# Patient Record
Sex: MALE | Race: WHITE | URBAN - NONMETROPOLITAN AREA
[De-identification: names, ages, dates, MRNs, and addresses within clinical notes are randomized per-mention and may not be internally consistent; named-entity substitution may affect disease eponyms.]

---

## 2022-10-27 ENCOUNTER — TELEPHONE (OUTPATIENT)
Dept: CARDIOLOGY CLINIC | Age: 67
End: 2022-10-27

## 2022-10-31 ENCOUNTER — TELEPHONE (OUTPATIENT)
Dept: CARDIOLOGY CLINIC | Age: 67
End: 2022-10-31

## 2022-12-02 ENCOUNTER — TELEPHONE (OUTPATIENT)
Dept: CARDIOLOGY CLINIC | Age: 67
End: 2022-12-02

## 2023-01-16 ENCOUNTER — OFFICE VISIT (OUTPATIENT)
Dept: CARDIOLOGY CLINIC | Age: 68
End: 2023-01-16
Payer: OTHER GOVERNMENT

## 2023-01-16 VITALS
SYSTOLIC BLOOD PRESSURE: 130 MMHG | HEART RATE: 62 BPM | HEIGHT: 77 IN | DIASTOLIC BLOOD PRESSURE: 62 MMHG | WEIGHT: 235 LBS | BODY MASS INDEX: 27.75 KG/M2

## 2023-01-16 DIAGNOSIS — R00.2 PALPITATIONS: ICD-10-CM

## 2023-01-16 DIAGNOSIS — I10 PRIMARY HYPERTENSION: ICD-10-CM

## 2023-01-16 DIAGNOSIS — I25.10 ATHEROSCLEROSIS OF NATIVE CORONARY ARTERY OF NATIVE HEART WITHOUT ANGINA PECTORIS: Primary | ICD-10-CM

## 2023-01-16 DIAGNOSIS — R01.1 HEART MURMUR: ICD-10-CM

## 2023-01-16 DIAGNOSIS — E78.2 MIXED HYPERLIPIDEMIA: ICD-10-CM

## 2023-01-16 DIAGNOSIS — I49.3 PVC (PREMATURE VENTRICULAR CONTRACTION): ICD-10-CM

## 2023-01-16 DIAGNOSIS — F17.211 CIGARETTE NICOTINE DEPENDENCE IN REMISSION: ICD-10-CM

## 2023-01-16 PROCEDURE — 99204 OFFICE O/P NEW MOD 45 MIN: CPT | Performed by: CLINICAL NURSE SPECIALIST

## 2023-01-16 PROCEDURE — 3075F SYST BP GE 130 - 139MM HG: CPT | Performed by: CLINICAL NURSE SPECIALIST

## 2023-01-16 PROCEDURE — 1123F ACP DISCUSS/DSCN MKR DOCD: CPT | Performed by: CLINICAL NURSE SPECIALIST

## 2023-01-16 PROCEDURE — 93000 ELECTROCARDIOGRAM COMPLETE: CPT | Performed by: CLINICAL NURSE SPECIALIST

## 2023-01-16 PROCEDURE — 3078F DIAST BP <80 MM HG: CPT | Performed by: CLINICAL NURSE SPECIALIST

## 2023-01-16 RX ORDER — SIMVASTATIN 40 MG
40 TABLET ORAL NIGHTLY
COMMUNITY

## 2023-01-16 RX ORDER — LEVOTHYROXINE SODIUM 175 UG/1
175 TABLET ORAL DAILY
COMMUNITY

## 2023-01-16 RX ORDER — ASPIRIN/CALCIUM/MAG/ALUMINUM 325 MG
TABLET ORAL
COMMUNITY

## 2023-01-16 RX ORDER — METHOCARBAMOL 500 MG/1
500 TABLET, FILM COATED ORAL 4 TIMES DAILY
COMMUNITY

## 2023-01-16 RX ORDER — OMEPRAZOLE 20 MG/1
20 CAPSULE, DELAYED RELEASE ORAL DAILY
COMMUNITY

## 2023-01-16 RX ORDER — MELOXICAM 7.5 MG/1
7.5 TABLET ORAL DAILY
COMMUNITY

## 2023-01-16 RX ORDER — METOPROLOL TARTRATE 100 MG/1
100 TABLET ORAL 2 TIMES DAILY
COMMUNITY

## 2023-01-16 RX ORDER — LISINOPRIL 5 MG/1
5 TABLET ORAL DAILY
COMMUNITY

## 2023-01-16 RX ORDER — ACETAMINOPHEN 500 MG
500 TABLET ORAL EVERY 6 HOURS PRN
COMMUNITY

## 2023-01-16 ASSESSMENT — ENCOUNTER SYMPTOMS
BACK PAIN: 1
EYE REDNESS: 0
NAUSEA: 0
COUGH: 0
SHORTNESS OF BREATH: 0
VOMITING: 0
WHEEZING: 0
ABDOMINAL PAIN: 0
FACIAL SWELLING: 0
CHEST TIGHTNESS: 0

## 2023-01-16 NOTE — PATIENT INSTRUCTIONS
[place] at the [facility] and [address] located on the first floor of [place] [address] through hospital main entrance and turn immediately to your left. Patient's contact number:  [phone] (home)      Lexiscan Stress Test      Lexiscan (regadenoson injection) is a prescription drug given through an IV line that increases blood flow through the arteries of the heart during a cardiac nuclear stress test.     There are two parts to a Lexiscan stress test: the rest portion and the exercise portion. For the rest portion, a radioactive tracer is injected into your arm through the IV. After 30 to 60 minutes, the process of imaging will begin. A nuclear camera will be placed on your chest area and images are taken for the next 15 to 20 minutes. For the exercise portion, a nurse will attach EKG electrodes to your chest to monitor your heart rate. The drug Merrick Mohit is administered to simulate stress on the heart. Your heart rhythm will then be monitored for the next few minutes. Your blood pressure will also be monitored throughout the exercise portion. [place] through the exercise portion, a second round of radioactive tracer is injected into your body. Your heart rate and EKG will be monitored for another few minutes after administering the drug. Test Preparation:    Bring a list of your current medications. Do not take any of your medications the morning of the test, but bring all morning medications with you as you will take them after the stress portion of the test is completed. Do not eat Bananas 24 hours prior to test.    No caffeine 24 hours prior to the testing. This includes: coffee, pop/soda, chocolate, cold medications, etc.  Any product that might contain caffeine. No nicotine or alcohol 12 hours prior to your test.   Nothing to eat or drink 6-8 hours prior to appointment time.   It is okay to drink small amounts of water during the four hours prior to the test.  Nitroglycerin patches must be taken off 1 hour before testing. Wear comfortable clothing. Please refrain from any strenuous exercise or activities the day before your test, or the day of your test.  The Nuclear Lexiscan Stress test takes about 2 ½ to 3 hours to complete. Hold Metoprolol night before and morning of test    If for any reason you are unable to keep this appointment, please contact Outpatient Scheduling, [phone], as soon as possible   to reschedule. [place] at the [facility] and [address] [place] [address] located on the first floor of [place] [address] through hospital main entrance and turn immediately to your left. Date/Time:     Patient's contact number:  [phone] (home)     Echocardiogram -  No prep. Takes approximately 30 min. An echocardiogram uses sound waves to produce images of your heart. This commonly used test allows your doctor to see how your heart is beating and pumping blood. Your doctor can use the images from an echocardiogram to identify various abnormalities in the heart muscle and valves. This test has 2 parts: You will be asked to disrobe from the waist up and given a gown to wear. The technologist will then hook up an EKG monitor to you for the entire exam.   You will then have an ultrasound of your heart (echocardiogram) to assess the heart muscle, heart valves and heart function. You may eat and take any medicines before the exam.     If you need to change your appointment, please call outpatient scheduling at [phone].

## 2023-01-16 NOTE — PROGRESS NOTES
[facility], [address], [address]  [address]  Phone: [phone]  Fax: [phone]    OFFICE VISIT:  2023    Albaro Guidry - : 1955    Reason For Visit:  Tami Billingsley is a 75 y.o. male who is here for New Patient (Pt has palpitations ) and Palpitations       Diagnosis Orders   1. Atherosclerosis of native coronary artery of native heart without angina pectoris        2. PVC (premature ventricular contraction)        3. Palpitations  EKG 12 lead      4. Heart murmur  ECHO Complete 2D W Doppler W Color      5. Primary hypertension        6. Mixed hyperlipidemia        7. Cigarette nicotine dependence in remission              HPI  Patient is here to establish care with a history of palpitations and PVCs. He moved here from [facility] in the last year or so. Follows with the [place] on a regular basis. He has followed with cardiology in [facility] with the palpitations and PVCs previously. In 2022, he presented to [facility] with increasing palpitations and fast heart rates. Since this time he has been diagnosed and treated for sleep apnea and he feels his palpitations have improved. He states they occur maybe 3 times per week only lasting a few seconds. He has no associated chest pain or syncope. He is quite active at home raising a garden and doing outdoor activities. CTA of the chest was done during his [facility] ER visit. He was found to have moderate to severe coronary atherosclerosis. He states this is also been seen on previous CAT scans. Cardiac risk factors include hypertension, hyperlipidemia. He quit smoking in approximately . Family history is unknown he states as he has been out of contact with his parents since his teenage years. No primary care provider on file. is PCP. Albaro Guidry has the following history as recorded in [employer]: There are no problems to display for this patient.     Past Medical History:   Diagnosis Date    Glaucoma     Hypertension     Mixed hyperlipidemia      Past Surgical History:   Procedure Laterality Date    ACHILLES TENDON SURGERY Right      No family history on file. Social History     Tobacco Use    Smoking status: Not on file    Smokeless tobacco: Not on file   Substance Use Topics    Alcohol use: Not on file      Current Outpatient Medications   Medication Sig Dispense Refill    Aspirin Buf,WlWpv-BcGpn-ZwDry, (BUFFERED ASPIRIN) 325 MG TABS Take by mouth      metoprolol (LOPRESSOR) 100 MG tablet Take 100 mg by mouth 2 times daily      lisinopril (PRINIVIL;ZESTRIL) 5 MG tablet Take 5 mg by mouth daily      levothyroxine (SYNTHROID) 175 MCG tablet Take 175 mcg by mouth Daily      simvastatin (ZOCOR) 40 MG tablet Take 40 mg by mouth nightly      omeprazole (PRILOSEC) 20 MG delayed release capsule Take 20 mg by mouth daily      methocarbamol (ROBAXIN) 500 MG tablet Take 500 mg by mouth 4 times daily      meloxicam (MOBIC) 7.5 MG tablet Take 7.5 mg by mouth daily      acetaminophen (TYLENOL) 500 MG tablet Take 500 mg by mouth every 6 hours as needed for Pain       No current facility-administered medications for this visit. Allergies: Patient has no known allergies. Review of Systems  Review of Systems   Constitutional:  Negative for activity change, diaphoresis, fatigue, fever and unexpected weight change. HENT:  Negative for facial swelling and nosebleeds. Eyes:  Negative for redness and visual disturbance. Respiratory:  Negative for cough, chest tightness, shortness of breath and wheezing. Cardiovascular:  Positive for palpitations. Negative for chest pain and leg swelling. Gastrointestinal:  Negative for abdominal pain, nausea and vomiting. Endocrine: Negative for cold intolerance and heat intolerance. Genitourinary:  Negative for dysuria and hematuria. Musculoskeletal:  Positive for back pain (low back). Negative for arthralgias and myalgias.    Skin: Negative for pallor and rash. Neurological:  Negative for dizziness, seizures, syncope, weakness and light-headedness. Hematological:  Does not bruise/bleed easily. Psychiatric/Behavioral:  Negative for agitation. The patient is not nervous/anxious. Objective  Vital Signs - /62   Pulse 62   Ht 6' 5\" (1.956 m)   Wt 235 lb (106.6 kg)   BMI 27.87 kg/m²   Physical Exam    Data:  No results found for: WBC, RBC, HGB, HCT, PLT   No results found for: CHOL, TRIG, HDL, LDLCALC  No results found for: NA, K, CL, CO2, GLUCOSE, BUN, CREATININE, CALCIUM, ALT, AST  No results found for: TSH    Assessment:     Diagnosis Orders   1. Atherosclerosis of native coronary artery of native heart without angina pectoris        2. PVC (premature ventricular contraction)        3. Palpitations  EKG 12 lead      4. Heart murmur  ECHO Complete 2D W Doppler W Color      5. Primary hypertension        6. Mixed hyperlipidemia        7. Cigarette nicotine dependence in remission          Atherosclerosis of coronary artery-moderate to severe noted per CTA of the chest done at [facility] under the media tab. Negative Lexiscan in 2021. In October 2022 had some significant palpitations to the emergency room. Plan will be to check a [facility] nuclear stress test to rule out any myocardial ischemia    Palpitations/PVCs-ongoing for a number of years. Symptoms are well controlled with metoprolol. Frequency of symptoms are about 3 times per week lasting only seconds. Continue present treatment. We discussed limiting caffeine, staying well-hydrated and avoiding stress    Heart murmur-noted on exam.  We will also check a 2D echocardiogram to rule out any significant valvular issues    Hypertension-stable on current regimen with lisinopril and metoprolol    Hyperlipidemia-stable per patient report on simvastatin which she has been on for a number of years he states.   Labs are monitored via the [address] [facility]    Stable cardiovascular status. No evidence of overt heart failure,angina or dysrhythmia. Plan    Orders Placed This Encounter   Procedures    NM MYOCARDIAL SPECT REST EXERCISE OR RX     With myocardial perfusion study with sestamibi     Standing Status:   Future     Standing Expiration Date:   1/16/2024     Order Specific Question:   Reason for Exam?     Answer:   EKG abnormalities     Order Specific Question:   Procedure Type     Answer:   Exercise     Order Specific Question:   Reason for exam:     Answer:   coronary atherosclerosis per CT scan    EKG 12 lead     Order Specific Question:   Reason for Exam?     Answer:   Irregular heart rate    ECHO Complete 2D W Doppler W Color     Standing Status:   Future     Standing Expiration Date:   1/16/2024     Order Specific Question:   Reason for exam:     Answer:   heart murmur     Return in about 6 weeks (around 2/27/2023) for APRN. Lexiscan nuclear stress test  2D echocardiogram  Call for worsening palpitations    Call with any questionsor concerns  Follow up with No primary care provider on file. for non cardiac problems  Report any new problems  Cardiovascular Fitness-Exercise as tolerated. Strive for 15 minutes of exercise most days of the week. Cardiac / HealthyDiet  Continue current medications as directed  Continue plan of treatment  It is always recommended that you bring your medicationsbottles with you to each visit - this is for your safety!        Beauty SoBUSHRA

## 2023-02-27 ENCOUNTER — TELEPHONE (OUTPATIENT)
Dept: CARDIOLOGY CLINIC | Age: 68
End: 2023-02-27

## 2023-02-27 DIAGNOSIS — I71.40 ABDOMINAL AORTIC ANEURYSM (AAA) WITHOUT RUPTURE, UNSPECIFIED PART (HCC): Primary | ICD-10-CM

## 2023-03-31 ENCOUNTER — HOSPITAL ENCOUNTER (OUTPATIENT)
Dept: NON INVASIVE DIAGNOSTICS | Age: 68
Discharge: HOME | End: 2023-03-31
Payer: OTHER GOVERNMENT

## 2023-03-31 ENCOUNTER — HOSPITAL ENCOUNTER (OUTPATIENT)
Dept: NUCLEAR MEDICINE | Age: 68
End: 2023-03-31
Payer: OTHER GOVERNMENT

## 2023-03-31 DIAGNOSIS — R01.1 HEART MURMUR: ICD-10-CM

## 2023-03-31 DIAGNOSIS — I25.10 ATHEROSCLEROSIS OF NATIVE CORONARY ARTERY OF NATIVE HEART WITHOUT ANGINA PECTORIS: ICD-10-CM

## 2023-03-31 LAB
LV EF: 58 %
LV EF: 72 %
LVEF MODALITY: NORMAL
LVEF MODALITY: NORMAL

## 2023-03-31 PROCEDURE — A9502 TC99M TETROFOSMIN: HCPCS | Performed by: CLINICAL NURSE SPECIALIST

## 2023-03-31 PROCEDURE — 93017 CV STRESS TEST TRACING ONLY: CPT

## 2023-03-31 PROCEDURE — 3430000000 HC RX DIAGNOSTIC RADIOPHARMACEUTICAL: Performed by: CLINICAL NURSE SPECIALIST

## 2023-03-31 PROCEDURE — 93016 CV STRESS TEST SUPVJ ONLY: CPT | Performed by: INTERNAL MEDICINE

## 2023-03-31 PROCEDURE — 6360000004 HC RX CONTRAST MEDICATION: Performed by: INTERNAL MEDICINE

## 2023-03-31 PROCEDURE — C8929 TTE W OR WO FOL WCON,DOPPLER: HCPCS

## 2023-03-31 PROCEDURE — 78452 HT MUSCLE IMAGE SPECT MULT: CPT | Performed by: INTERNAL MEDICINE

## 2023-03-31 PROCEDURE — 93018 CV STRESS TEST I&R ONLY: CPT | Performed by: INTERNAL MEDICINE

## 2023-03-31 RX ADMIN — TETROFOSMIN 8 MILLICURIE: 1.38 INJECTION, POWDER, LYOPHILIZED, FOR SOLUTION INTRAVENOUS at 10:00

## 2023-03-31 RX ADMIN — PERFLUTREN 1.5 ML: 6.52 INJECTION, SUSPENSION INTRAVENOUS at 09:35

## 2023-03-31 RX ADMIN — TETROFOSMIN 24 MILLICURIE: 1.38 INJECTION, POWDER, LYOPHILIZED, FOR SOLUTION INTRAVENOUS at 11:40

## 2023-04-20 ENCOUNTER — HOSPITAL ENCOUNTER (OUTPATIENT)
Dept: ULTRASOUND IMAGING | Age: 68
Discharge: HOME | End: 2023-04-20
Payer: OTHER GOVERNMENT

## 2023-04-20 ENCOUNTER — TELEPHONE (OUTPATIENT)
Dept: GASTROENTEROLOGY | Age: 68
End: 2023-04-20

## 2023-04-20 DIAGNOSIS — R10.13 EPIGASTRIC PAIN: ICD-10-CM

## 2023-04-20 PROCEDURE — 76705 ECHO EXAM OF ABDOMEN: CPT | Performed by: RADIOLOGY

## 2023-04-20 PROCEDURE — 76705 ECHO EXAM OF ABDOMEN: CPT

## 2023-04-20 NOTE — TELEPHONE ENCOUNTER
----- Message from BUSHRA Fallon sent at 4/20/2023  1:18 PM CDT -----  Fatty liver noted otherwise normal Gallbladder US

## 2023-04-21 ENCOUNTER — TELEPHONE (OUTPATIENT)
Dept: GASTROENTEROLOGY | Age: 68
End: 2023-04-21

## 2023-05-17 ENCOUNTER — ANESTHESIA EVENT (OUTPATIENT)
Dept: OPERATING ROOM | Age: 68
End: 2023-05-17

## 2023-05-18 ENCOUNTER — HOSPITAL ENCOUNTER (OUTPATIENT)
Age: 68
Setting detail: OUTPATIENT SURGERY
Discharge: HOME | End: 2023-05-18
Attending: INTERNAL MEDICINE | Admitting: INTERNAL MEDICINE

## 2023-05-18 ENCOUNTER — APPOINTMENT (OUTPATIENT)
Dept: OPERATING ROOM | Age: 68
End: 2023-05-18

## 2023-05-18 ENCOUNTER — ANESTHESIA (OUTPATIENT)
Dept: OPERATING ROOM | Age: 68
End: 2023-05-18

## 2023-05-18 ENCOUNTER — HOSPITAL ENCOUNTER (OUTPATIENT)
Age: 68
Setting detail: SPECIMEN
Discharge: HOME | End: 2023-05-18
Payer: OTHER GOVERNMENT

## 2023-05-18 VITALS
WEIGHT: 230 LBS | DIASTOLIC BLOOD PRESSURE: 69 MMHG | RESPIRATION RATE: 18 BRPM | HEIGHT: 77 IN | HEART RATE: 52 BPM | TEMPERATURE: 97.7 F | BODY MASS INDEX: 27.16 KG/M2 | OXYGEN SATURATION: 97 % | SYSTOLIC BLOOD PRESSURE: 125 MMHG

## 2023-05-18 PROCEDURE — 45378 DIAGNOSTIC COLONOSCOPY: CPT

## 2023-05-18 PROCEDURE — G8918 PT W/O PREOP ORDER IV AB PRO: HCPCS

## 2023-05-18 PROCEDURE — 88305 TISSUE EXAM BY PATHOLOGIST: CPT

## 2023-05-18 PROCEDURE — 43239 EGD BIOPSY SINGLE/MULTIPLE: CPT

## 2023-05-18 PROCEDURE — G8907 PT DOC NO EVENTS ON DISCHARG: HCPCS

## 2023-05-18 PROCEDURE — 88342 IMHCHEM/IMCYTCHM 1ST ANTB: CPT

## 2023-05-18 RX ORDER — OMEPRAZOLE 40 MG/1
40 CAPSULE, DELAYED RELEASE ORAL
Qty: 90 CAPSULE | Refills: 3 | Status: SHIPPED | OUTPATIENT
Start: 2023-05-18

## 2023-05-18 RX ORDER — DORZOLAMIDE HYDROCHLORIDE AND TIMOLOL MALEATE 20; 5 MG/ML; MG/ML
1 SOLUTION/ DROPS OPHTHALMIC 2 TIMES DAILY
COMMUNITY

## 2023-05-18 RX ORDER — PROPOFOL 10 MG/ML
INJECTION, EMULSION INTRAVENOUS PRN
Status: DISCONTINUED | OUTPATIENT
Start: 2023-05-18 | End: 2023-05-18 | Stop reason: SDUPTHER

## 2023-05-18 RX ORDER — LIDOCAINE HYDROCHLORIDE 10 MG/ML
INJECTION, SOLUTION EPIDURAL; INFILTRATION; INTRACAUDAL; PERINEURAL PRN
Status: DISCONTINUED | OUTPATIENT
Start: 2023-05-18 | End: 2023-05-18 | Stop reason: SDUPTHER

## 2023-05-18 RX ORDER — SODIUM CHLORIDE, SODIUM LACTATE, POTASSIUM CHLORIDE, CALCIUM CHLORIDE 600; 310; 30; 20 MG/100ML; MG/100ML; MG/100ML; MG/100ML
INJECTION, SOLUTION INTRAVENOUS CONTINUOUS
Status: DISCONTINUED | OUTPATIENT
Start: 2023-05-18 | End: 2023-05-18 | Stop reason: HOSPADM

## 2023-05-18 RX ADMIN — LIDOCAINE HYDROCHLORIDE 30 MG: 10 INJECTION, SOLUTION EPIDURAL; INFILTRATION; INTRACAUDAL; PERINEURAL at 09:31

## 2023-05-18 RX ADMIN — SODIUM CHLORIDE, SODIUM LACTATE, POTASSIUM CHLORIDE, CALCIUM CHLORIDE: 600; 310; 30; 20 INJECTION, SOLUTION INTRAVENOUS at 08:48

## 2023-05-18 RX ADMIN — PROPOFOL 300 MG: 10 INJECTION, EMULSION INTRAVENOUS at 09:31

## 2023-05-18 NOTE — ANESTHESIA PRE PROCEDURE
Evan Soto MD           Allergies:  No Known Allergies    Problem List:  There is no problem list on file for this patient. Past Medical History:        Diagnosis Date    Abdominal aortic aneurysm (AAA) 3.0 cm to 5.5 cm in diameter in male ([address]     3.1 cm diagnosed 2/2023    Glaucoma     Hypertension     Mixed hyperlipidemia        Past Surgical History:        Procedure Laterality Date    ACHILLES TENDON SURGERY Right     COLONOSCOPY  2016    normal per pt- 10 yr recall    UPPER GASTROINTESTINAL ENDOSCOPY  2016    normal per pt       Social History:    Social History     Tobacco Use    Smoking status: Never    Smokeless tobacco: Never   Substance Use Topics    Alcohol use: Yes     Alcohol/week: 1.0 standard drink     Types: 1 Cans of beer per week     Comment: occ                                Counseling given: Not Answered      Vital Signs (Current):   Vitals:    05/18/23 0832   BP: 124/79   Pulse: 60   Resp: 20   Temp: 97.6 °F (36.4 °C)   TempSrc: Temporal   SpO2: 95%   Weight: 230 lb (104.3 kg)   Height: 6' 5\" (1.956 m)                                              BP Readings from Last 3 Encounters:   05/18/23 124/79   04/11/23 115/80   01/16/23 130/62       NPO Status: Time of last liquid consumption: 0700 (sip with med, finished prep 0400)                        Time of last solid consumption: 1430                        Date of last liquid consumption: 05/18/23                        Date of last solid food consumption: 05/16/23    BMI:   Wt Readings from Last 3 Encounters:   05/18/23 230 lb (104.3 kg)   04/11/23 230 lb (104.3 kg)   01/16/23 235 lb (106.6 kg)     Body mass index is 27.27 kg/m².     CBC: No results found for: WBC, RBC, HGB, HCT, MCV, RDW, PLT    CMP: No results found for: NA, K, CL, CO2, BUN, CREATININE, GFRAA, AGRATIO, LABGLOM, GLUCOSE, GLU, PROT, CALCIUM, BILITOT, ALKPHOS, AST, ALT    POC Tests: No results for input(s): POCGLU, POCNA, POCK, POCCL, POCBUN, POCHEMO, POCHCT in the

## 2023-05-18 NOTE — H&P
Patient Name: Nuha Mcgowan  : 1955  MRN: [id number]  DATE: 23    Allergies: No Known Allergies     ENDOSCOPY  History and Physical    Procedure:    [x] Diagnostic Colonoscopy       [] Screening Colonoscopy  [x] EGD      [] ERCP      [] EUS       [] Other    [x] Previous office notes/History and Physical reviewed from the patients chart. Please see EMR for further details of HPI. I have examined the patient's status immediately prior to the procedure and:      Indications/HPI:    [x]Abdominal Pain   []Barretts  []Screening/Surveillance   []History of Polyps  []Dysphagia            [] +Cologard/DNA testing  []Abnormal Imaging              []EOE Hx              [] Family Hx of CRC/Polyps  []Anemia                            []Food Impaction       []Recent Poor Prep  []GI Bleed             []Lymphadenopathy  []History of Polyps  []Change in bowel habits []Heartburn/Reflux  []Cancer- GI/Lung  []Chest Pain - Non Cardiac []Heme (+) Stool []Ulcers  []Constipation  []Hemoptysis  []Incontinence    [x]Diarrhea  []Hypoxemia  []Rectal Bleed (BRBPR)  []Nausea/Vomiting   [] Varices  []Crohns/Colitis  []Pancreatic Cyst   [] Cirrhosis   []Pancreatitis    []Abnormal MRCP  []Elevated LFT [] Stent Removal, Previous ERCP  []Other:     Anesthesia:   [x] MAC [] Moderate Sedation   [] General   [] None     ROS: 12 pt Review of Symptoms was negative unless mentioned above    Medications:   Prior to Admission medications    Medication Sig Start Date End Date Taking?  Authorizing Provider   dorzolamide-timolol (COSOPT) 22.3-6.8 MG/ML ophthalmic solution Place 1 drop into the right eye 2 times daily   Yes Historical Provider, MD   Cholecalciferol (VITAMIN D) 125 MCG (5000 UT) CAPS Take 1 tablet by mouth daily    Historical Provider, MD   ACYCLOVIR PO Take 1,000 mg by mouth as needed    Historical Provider, MD   Aspirin Buf,[id number]-LlWki-MtEhj, (BUFFERED ASPIRIN) 325 MG TABS Take 1 tablet by mouth daily    Historical Provider, MD

## 2023-05-18 NOTE — OP NOTE
Patient: Dereje Palma : [birth year]  [facility] Rec#: [id number] Acc#: [id number]   Primary Care Provider MENA Castro    Date of Procedure:  2023    Endoscopist: Savana Hagen MD    Referring Provider: MENA Castro, BUSHRA Tierney    Operation Performed: Colonoscopy     Indications: change in bowel habits- diarrhea     Anesthesia:  Sedation was administered by anesthesia who monitored the patient during the procedure. I met with Dereje Palma prior to procedure. We discussed the procedure itself, and I have discussed the risks of endoscopy (including-- but not limited to-- pain, discomfort, bleeding potentially requiring second endoscopic procedure and/or blood transfusion, organ perforation requiring operative repair, damage to organs near the colon, infection, aspiration, cardiopulmonary/allergic reaction), benefits, indications to endoscopy. Additionally, we discussed options other than colonoscopy. The patient expressed understanding. All questions answered. The patient decided to proceed with the procedure. Signed informed consent was placed on the chart. Blood Loss: minimal    Withdrawal time: n/a  Bowel Prep: adequate     Complications: no immediate complications    DESCRIPTION OF PROCEDURE:     A time out was performed. After written informed consent was obtained, the patient was placed in the left lateral position. The perianal area was inspected, and a digital rectal exam was performed. A rectal exam was performed: normal tone, no palpable lesions. At this point, a forward viewing Olympus colonoscope was inserted into the anus and carefully advanced to the cecum. The cecum was identified by the ileocecal valve and the appendiceal orifice. The colonoscope was then slowly withdrawn with careful inspection of the mucosa in a linear and circumferential fashion. The scope was retroflexed in the rectum.  Suction was utilized during the procedure to remove as much air as possible
Cherelle Toussaint am scribing for and in the presence of Dr. Eva Martinez MD.  Electronically signed by Virgilio Torres RN on 5/18/2023 at 8:40 AM    I personally performed the services described in this documentation as scribed by Neil Sandy, and it appears accurate and complete.      Jr Mattson MD  5/18/2023

## 2023-05-18 NOTE — DISCHARGE INSTRUCTIONS
EGD RECOMMENDATIONS:    1. Await path results- if indicative of Mcmanus's- repeat EGD in 1 year. 2.  Increase Prilosec to 40mg daily  3. Minimize NSAID (Mobic) use  4. Follow up OV with RUBINA Cartagena in 8 weeks. Colonoscopy Recommendations:  1. Repeat colonoscopy: 10 years for CRC screening    POST-OP ORDERS: ENDOSCOPY & COLONOSCOPY:    1. Rest today. 2. DO NOT eat or drink until wide awake; eat your usual diet today in moderate amount only. 3. DO NOT drive today. 4. Call physician if you have severe pain, vomiting, fever, rectal bleeding or black bowel movements. 5.  If a biopsy was taken or a polyp removed, you should expect to hear results in about 7-10 days. If you have heard nothing from your physician by then, call the office for results. 6.  Discharge home when patient awake, vitals signs stable and tolerating liquids. NSAIDS Non-steroidal Anti-Inflammatory  You have been directed by your physician to avoid any NSAID's; the following medications are a list of those to avoid. If you think that you are taking any NSAID's notify your physician.    Over The Counter  Advil                      Motrin  Nuprin                   Ibuprofen  Midol                     Aleve  Naproxen              Orudis  Aspirin                   Micaela-Randall  Prescriptions and Generics  Cataflam              Relafen  Voltaren               Clinoril  Indocin                 Naproxen  Arthrotec              Lodine  Daypro                 Nalfon  Toradol                Ansaid  Feldene               Meclofenamate  Fenoprofen          Ponstel  Mobic                   Celebrex  Vioxx

## 2023-05-18 NOTE — ANESTHESIA POSTPROCEDURE EVALUATION
Department of Anesthesiology  Postprocedure Note    Patient: Lizabeth Doty  MRN: [id number]  YOB: 1955  Date of evaluation: 5/18/2023      Procedure Summary     Date: 05/18/23 Room / Location: Pending sale to [facility] ENDO 01 / [address]    Anesthesia Start: 5967 Anesthesia Stop:     Procedures:       EGD BIOPSY (Esophagus)      COLONOSCOPY DIAGNOSTIC (Abdomen) Diagnosis:       Epigastric pain      Epigastric burning sensation      Change in bowel habit      (Epigastric pain [R10.13])      (Epigastric burning sensation [R10.13])      (Change in bowel habit [R19.4])    Surgeons: Nelda Dean MD Responsible Provider: BUSHRA Angulo CRNA    Anesthesia Type: general, TIVA ASA Status: 3          Anesthesia Type: No value filed.     Anny Phase I:      Anny Phase II:        Anesthesia Post Evaluation    Patient location during evaluation: bedside  Patient participation: complete - patient participated  Level of consciousness: sleepy but conscious  Pain score: 0  Airway patency: patent  Nausea & Vomiting: no nausea and no vomiting  Complications: no  Cardiovascular status: blood pressure returned to baseline  Respiratory status: acceptable, room air and spontaneous ventilation  Hydration status: euvolemic

## 2023-07-17 ENCOUNTER — TELEMEDICINE (OUTPATIENT)
Dept: GASTROENTEROLOGY | Age: 68
End: 2023-07-17
Payer: OTHER GOVERNMENT

## 2023-07-17 DIAGNOSIS — K21.9 GASTROESOPHAGEAL REFLUX DISEASE, UNSPECIFIED WHETHER ESOPHAGITIS PRESENT: Primary | ICD-10-CM

## 2023-07-17 PROCEDURE — 1123F ACP DISCUSS/DSCN MKR DOCD: CPT | Performed by: NURSE PRACTITIONER

## 2023-07-17 PROCEDURE — 99213 OFFICE O/P EST LOW 20 MIN: CPT | Performed by: NURSE PRACTITIONER

## 2023-07-17 ASSESSMENT — ENCOUNTER SYMPTOMS
VOMITING: 0
NAUSEA: 0
ABDOMINAL DISTENTION: 0
BLOOD IN STOOL: 0
CHOKING: 0
CONSTIPATION: 0
RECTAL PAIN: 0
COUGH: 0
TROUBLE SWALLOWING: 0
ABDOMINAL PAIN: 0
DIARRHEA: 0
ANAL BLEEDING: 0
SHORTNESS OF BREATH: 0

## 2023-07-17 NOTE — PROGRESS NOTES
Marie King, was evaluated through a synchronous (real-time) audio-video encounter. The patient (or guardian if applicable) is aware that this is a billable service, which includes applicable co-pays. This Virtual Visit was conducted with patient's (and/or legal guardian's) consent. Patient identification was verified, and a caregiver was present when appropriate. The patient was located at Home: [address] [place] [address]  Provider was located at [address] [place] [address] ([address] [place] [address]): [address] [place] [address] on 7/17/2023 at 10:56 AM    An electronic signature was used to authenticate this note. Subjective:     Patient ID: Marie King is a 70 y.o. male  PCP: MENA Holley  Referring Provider: No ref. provider found    HPI  Patient presents to the office today with the following complaints: No chief complaint on file. Patient seen per video visit for follow up after EGD/CLN   EGD/CLN/ pathology reports all reviewed and discussed with patient and all questions answered. Reports are in Epic  Denies any post-procedure complications    Reports he is doing well, he has changed his diet and eating more natural foods and he is taking omeprazole daily and he is not having any issues with acid reflux. Reports his bowel movements have improved, states he was drinking green tea and he thinks this was the issue, since stopping it his bowels are moving normally denies any issues today   Assessment:     1. Gastroesophageal reflux disease, unspecified whether esophagitis present           Plan:   Continue omeprazole daily   Follow up in 1 year or sooner if needed   Orders  No orders of the defined types were placed in this encounter. Medications  No orders of the defined types were placed in this encounter.         Patient History:     Past Medical History:   Diagnosis Date    Abdominal aortic aneurysm (AAA) 3.0 cm to 5.5 cm in diameter in male [facility])     3.1 cm diagnosed 2/2023    Glaucoma

## 2024-01-10 ENCOUNTER — OFFICE VISIT (OUTPATIENT)
Dept: CARDIOLOGY CLINIC | Age: 69
End: 2024-01-10
Payer: OTHER GOVERNMENT

## 2024-01-10 VITALS
HEIGHT: 77 IN | DIASTOLIC BLOOD PRESSURE: 76 MMHG | HEART RATE: 71 BPM | WEIGHT: 240 LBS | BODY MASS INDEX: 28.34 KG/M2 | SYSTOLIC BLOOD PRESSURE: 124 MMHG

## 2024-01-10 DIAGNOSIS — I49.3 PVC (PREMATURE VENTRICULAR CONTRACTION): Primary | ICD-10-CM

## 2024-01-10 PROCEDURE — 1123F ACP DISCUSS/DSCN MKR DOCD: CPT | Performed by: INTERNAL MEDICINE

## 2024-01-10 PROCEDURE — 93000 ELECTROCARDIOGRAM COMPLETE: CPT | Performed by: INTERNAL MEDICINE

## 2024-01-10 PROCEDURE — 99214 OFFICE O/P EST MOD 30 MIN: CPT | Performed by: INTERNAL MEDICINE

## 2024-01-10 RX ORDER — METOPROLOL SUCCINATE 100 MG/1
100 TABLET, EXTENDED RELEASE ORAL 2 TIMES DAILY
Qty: 180 TABLET | Refills: 3 | Status: SHIPPED | OUTPATIENT
Start: 2024-01-10

## 2024-01-10 ASSESSMENT — ENCOUNTER SYMPTOMS
DIARRHEA: 0
BACK PAIN: 0
ABDOMINAL PAIN: 0
VOMITING: 0
WHEEZING: 0
COUGH: 0
BLOOD IN STOOL: 0
SHORTNESS OF BREATH: 0
ABDOMINAL DISTENTION: 0

## 2024-01-10 NOTE — PROGRESS NOTES
[facility]  Cardiology Office Note  [address], [facility]  [address]  Phone: [phone]  Fax: [phone]                            Date:  1/10/2024  Patient: Ricco Miller  Age:  68 y.o., 1955    Referral: No ref. provider found      PROBLEM LIST:    There are no problems to display for this patient.    1.  Coronary artery disease with noted moderate to severe coronary calcification on CT, borderline abnormal stress nuclear study 3/31/2023 with apical defect, good effort tolerance, asymptomatic.  2.  Nonexertional symptomatic PVCs.  3.  Hypertension.  4.  Small AAA (3.1 cm 2/2023).  5.  Mild aortic stenosis (mean gradient 12 mmHg), normal LV ejection fraction (echo 3/2023)    PRESENTATION: Ricco Miller is a 68 y.o. year old male presents for follow-up evaluation.  For the most part he has been doing fairly well with no significant issues.  He exercises daily on an elliptical and does over 30 minutes up to 3.7 mph for at least 2 miles.  He does not report any chest pain, pressure or shortness of breath.  At rest he will have more PVCs usually in cold weather but these do not bother him during exertion.  He does not use tobacco and quit over 20 years ago.  Does not know his family history.    REVIEW OF SYSTEMS:  Review of Systems   Constitutional:  Negative for activity change, diaphoresis and fatigue.   HENT:  Negative for hearing loss, nosebleeds and tinnitus.    Eyes:  Negative for visual disturbance.   Respiratory:  Negative for cough, shortness of breath and wheezing.    Cardiovascular:  Positive for palpitations. Negative for chest pain and leg swelling.   Gastrointestinal:  Negative for abdominal distention, abdominal pain, blood in stool, diarrhea and vomiting.   Endocrine: Negative for cold intolerance, heat intolerance, polydipsia, polyphagia and polyuria.   Genitourinary:  Negative for difficulty urinating, flank pain and hematuria.

## 2024-02-29 ENCOUNTER — HOSPITAL ENCOUNTER (OUTPATIENT)
Dept: CT IMAGING | Age: 69
Discharge: HOME | End: 2024-02-29
Payer: OTHER GOVERNMENT

## 2024-02-29 DIAGNOSIS — I71.40 ABDOMINAL AORTIC ANEURYSM (AAA) WITHOUT RUPTURE, UNSPECIFIED PART (HCC): ICD-10-CM

## 2024-02-29 PROCEDURE — 74176 CT ABD & PELVIS W/O CONTRAST: CPT

## 2024-05-24 ENCOUNTER — TELEPHONE (OUTPATIENT)
Dept: CARDIOLOGY CLINIC | Age: 69
End: 2024-05-24

## 2024-05-24 NOTE — TELEPHONE ENCOUNTER
Pt called and needs to have someone call him and tell him why he needs this appt, he said [facility] and he doesn't want to gout side them making appts, I explained it was FU and he stated that it is too soon, please call.

## 2024-05-24 NOTE — TELEPHONE ENCOUNTER
Called and lvm with patient to reschedule 7/8/24 appt from [facility] to [place], [place]    Please state to patient that we apologize but we are having to move their [facility] appt to [place] location due to unexpected space constraints and will only be temporary. As soon as we get a set date of return to [facility] location we will let them know.  Again, we apologize for the inconvenience this may cause.

## 2024-07-31 ENCOUNTER — OFFICE VISIT (OUTPATIENT)
Dept: PRIMARY CARE CLINIC | Age: 69
End: 2024-07-31
Payer: OTHER GOVERNMENT

## 2024-07-31 VITALS
WEIGHT: 235 LBS | OXYGEN SATURATION: 95 % | SYSTOLIC BLOOD PRESSURE: 138 MMHG | DIASTOLIC BLOOD PRESSURE: 74 MMHG | BODY MASS INDEX: 27.75 KG/M2 | HEART RATE: 88 BPM | HEIGHT: 77 IN | TEMPERATURE: 98.2 F

## 2024-07-31 DIAGNOSIS — J01.00 ACUTE NON-RECURRENT MAXILLARY SINUSITIS: Primary | ICD-10-CM

## 2024-07-31 PROCEDURE — 99203 OFFICE O/P NEW LOW 30 MIN: CPT | Performed by: NURSE PRACTITIONER

## 2024-07-31 PROCEDURE — 1123F ACP DISCUSS/DSCN MKR DOCD: CPT | Performed by: NURSE PRACTITIONER

## 2024-07-31 RX ORDER — CEFDINIR 300 MG/1
300 CAPSULE ORAL 2 TIMES DAILY
Qty: 20 CAPSULE | Refills: 0 | Status: SHIPPED | OUTPATIENT
Start: 2024-07-31 | End: 2024-08-10

## 2024-07-31 ASSESSMENT — ENCOUNTER SYMPTOMS
VOMITING: 0
EYE DISCHARGE: 0
WHEEZING: 0
CONSTIPATION: 0
SINUS PRESSURE: 1
COLOR CHANGE: 0
SORE THROAT: 0
RHINORRHEA: 0
BLOOD IN STOOL: 0
NAUSEA: 0
DIARRHEA: 0
COUGH: 1
ABDOMINAL PAIN: 0
EYE ITCHING: 0
SHORTNESS OF BREATH: 0

## 2024-07-31 NOTE — PATIENT INSTRUCTIONS
- Take full course of antibiotics  - Increase fluid intake  - May continue OTC coricidin.  - May use OTC claritin/zyrtec and nasal spray such as flonase to help symptoms  - If patient is not improving or developing any new/worsening symptoms then return to clinic or f/u with PCP

## 2024-07-31 NOTE — PROGRESS NOTES
[facility]  [facility] [address]  [address]  [place] [address]  Dept: [phone]  Dept Fax: [phone]  Loc: [phone]    Ricco Miller is a 69 y.o. male who presents today for his medical conditions/complaints as noted below.  Ricco Miller is complaining of Sinus Problem        HPI:   Cough  This is a new problem. The current episode started in the past 7 days. The problem has been waxing and waning. The problem occurs every few minutes. The cough is Non-productive. Associated symptoms include nasal congestion. Pertinent negatives include no chest pain, chills, ear pain, fever, headaches, myalgias, rash, rhinorrhea, sore throat, shortness of breath or wheezing. The symptoms are aggravated by lying down. Treatments tried: coricidin. The treatment provided mild relief.       Past Medical History:   Diagnosis Date    Abdominal aortic aneurysm (AAA) 3.0 cm to 5.5 cm in diameter in male (HCC)     3.1 cm diagnosed 2/2023    Glaucoma     Hypertension     Mixed hyperlipidemia        Past Surgical History:   Procedure Laterality Date    ACHILLES TENDON SURGERY Right     COLONOSCOPY  2016    normal per pt- 10 yr recall    COLONOSCOPY N/A 05/18/2023    Dr BLAYNE Sommers-Normal, 10 yr recall    UPPER GASTROINTESTINAL ENDOSCOPY  2016    normal per pt    UPPER GASTROINTESTINAL ENDOSCOPY N/A 05/18/2023    Dr BLAYNE Sommers-Esophagitis, gastritis, no h pylori       Family History   Problem Relation Age of Onset    Colon Polyps Neg Hx     Colon Cancer Neg Hx        Social History     Tobacco Use    Smoking status: Never    Smokeless tobacco: Never   Substance Use Topics    Alcohol use: Yes     Alcohol/week: 1.0 standard drink of alcohol     Types: 1 Cans of beer per week     Comment: occ        Current Outpatient Medications   Medication Sig Dispense Refill    cefdinir (OMNICEF) 300 MG capsule Take 1 capsule by mouth 2 times daily for 10 days 20 capsule 0    metoprolol succinate (TOPROL

## 2024-08-30 ENCOUNTER — OFFICE VISIT (OUTPATIENT)
Dept: PRIMARY CARE CLINIC | Age: 69
End: 2024-08-30
Payer: OTHER GOVERNMENT

## 2024-08-30 VITALS
SYSTOLIC BLOOD PRESSURE: 120 MMHG | HEART RATE: 65 BPM | BODY MASS INDEX: 27.63 KG/M2 | TEMPERATURE: 97.7 F | DIASTOLIC BLOOD PRESSURE: 78 MMHG | OXYGEN SATURATION: 98 % | WEIGHT: 233 LBS

## 2024-08-30 DIAGNOSIS — R09.82 POST-NASAL DRIP: Primary | ICD-10-CM

## 2024-08-30 DIAGNOSIS — R05.1 ACUTE COUGH: ICD-10-CM

## 2024-08-30 PROCEDURE — 1123F ACP DISCUSS/DSCN MKR DOCD: CPT | Performed by: NURSE PRACTITIONER

## 2024-08-30 PROCEDURE — 99213 OFFICE O/P EST LOW 20 MIN: CPT | Performed by: NURSE PRACTITIONER

## 2024-08-30 RX ORDER — METHYLPREDNISOLONE 4 MG
TABLET, DOSE PACK ORAL
Qty: 1 KIT | Refills: 0 | Status: SHIPPED | OUTPATIENT
Start: 2024-08-30 | End: 2024-09-05

## 2024-08-30 RX ORDER — BENZONATATE 100 MG/1
100 CAPSULE ORAL 3 TIMES DAILY PRN
Qty: 30 CAPSULE | Refills: 0 | Status: SHIPPED | OUTPATIENT
Start: 2024-08-30 | End: 2024-09-09

## 2024-08-30 ASSESSMENT — ENCOUNTER SYMPTOMS
SORE THROAT: 0
WHEEZING: 0
SINUS PRESSURE: 0
STRIDOR: 0
EYE PAIN: 0
SHORTNESS OF BREATH: 0
COLOR CHANGE: 0
COUGH: 1
CHEST TIGHTNESS: 0
ABDOMINAL PAIN: 0
TROUBLE SWALLOWING: 0
ABDOMINAL DISTENTION: 0
EYE DISCHARGE: 0

## 2024-08-30 NOTE — PATIENT INSTRUCTIONS
Encourage fluids, Tylenol/Ibuprofen, OTC decongestants   Medrol and tessalon sent to pharmacy.  If symptoms worsen or fail to improve follow-up with office or PCP  If SOB, chest pain, or high persistent fevers occur, go to ER    Patient verbalized understanding and agrees to plan

## 2024-08-30 NOTE — PROGRESS NOTES
chills, fatigue and fever.   HENT:  Positive for ear pain and postnasal drip. Negative for congestion, sinus pressure, sore throat and trouble swallowing.    Eyes:  Negative for pain and discharge.   Respiratory:  Positive for cough. Negative for chest tightness, shortness of breath, wheezing and stridor.    Cardiovascular:  Negative for chest pain and palpitations.   Gastrointestinal:  Negative for abdominal distention and abdominal pain.   Genitourinary:  Negative for difficulty urinating, dysuria and hematuria.   Musculoskeletal:  Negative for arthralgias, neck pain and neck stiffness.   Skin:  Negative for color change and rash.   Neurological:  Negative for dizziness, syncope, speech difficulty, weakness and numbness.   Psychiatric/Behavioral:  Negative for confusion and suicidal ideas.        Objective    Physical Exam  Vitals and nursing note reviewed.   Constitutional:       General: He is not in acute distress.     Appearance: Normal appearance.   HENT:      Head: Normocephalic.      Right Ear: Tympanic membrane, ear canal and external ear normal.      Left Ear: Tympanic membrane, ear canal and external ear normal.      Nose: Nose normal. No congestion or rhinorrhea.      Mouth/Throat:      Mouth: Mucous membranes are moist.      Pharynx: Oropharynx is clear. No posterior oropharyngeal erythema.      Comments: Clear post nasal drip  Eyes:      Conjunctiva/sclera: Conjunctivae normal.      Pupils: Pupils are equal, round, and reactive to light.   Cardiovascular:      Rate and Rhythm: Normal rate and regular rhythm.      Pulses: Normal pulses.      Heart sounds: Normal heart sounds. No murmur heard.  Pulmonary:      Effort: Pulmonary effort is normal. No respiratory distress.      Breath sounds: Normal breath sounds. No stridor. No wheezing.   Abdominal:      General: Abdomen is flat. Bowel sounds are normal. There is no distension.      Tenderness: There is no abdominal tenderness.   Musculoskeletal:          Instructions   Encourage fluids, Tylenol/Ibuprofen, OTC decongestants   Medrol and tessalon sent to pharmacy.  If symptoms worsen or fail to improve follow-up with office or PCP  If SOB, chest pain, or high persistent fevers occur, go to ER    Patient verbalized understanding and agrees to plan        Electronically signed by BUSHRA Quiroga CNP on 8/30/2024 at 4:45 PM

## 2024-10-08 RX ORDER — METOPROLOL SUCCINATE 100 MG/1
TABLET, EXTENDED RELEASE ORAL
Qty: 180 TABLET | Refills: 3 | Status: SHIPPED | OUTPATIENT
Start: 2024-10-08

## 2024-11-13 SDOH — HEALTH STABILITY: PHYSICAL HEALTH: ON AVERAGE, HOW MANY MINUTES DO YOU ENGAGE IN EXERCISE AT THIS LEVEL?: 20 MIN

## 2024-11-13 SDOH — HEALTH STABILITY: PHYSICAL HEALTH: ON AVERAGE, HOW MANY DAYS PER WEEK DO YOU ENGAGE IN MODERATE TO STRENUOUS EXERCISE (LIKE A BRISK WALK)?: 2 DAYS

## 2024-11-14 ENCOUNTER — OFFICE VISIT (OUTPATIENT)
Dept: FAMILY MEDICINE CLINIC | Age: 69
End: 2024-11-14

## 2024-11-14 VITALS
WEIGHT: 244 LBS | HEIGHT: 77 IN | SYSTOLIC BLOOD PRESSURE: 130 MMHG | BODY MASS INDEX: 28.81 KG/M2 | HEART RATE: 72 BPM | OXYGEN SATURATION: 98 % | DIASTOLIC BLOOD PRESSURE: 80 MMHG | TEMPERATURE: 97.1 F

## 2024-11-14 DIAGNOSIS — Z23 IMMUNIZATION DUE: ICD-10-CM

## 2024-11-14 DIAGNOSIS — I10 PRIMARY HYPERTENSION: ICD-10-CM

## 2024-11-14 DIAGNOSIS — Z86.79 HISTORY OF ABDOMINAL AORTIC ANEURYSM (AAA): ICD-10-CM

## 2024-11-14 DIAGNOSIS — M54.2 CERVICAL SPINE PAIN: ICD-10-CM

## 2024-11-14 DIAGNOSIS — Z76.89 ENCOUNTER TO ESTABLISH CARE: Primary | ICD-10-CM

## 2024-11-14 DIAGNOSIS — M54.12 CERVICAL RADICULOPATHY: ICD-10-CM

## 2024-11-14 SDOH — ECONOMIC STABILITY: FOOD INSECURITY: WITHIN THE PAST 12 MONTHS, THE FOOD YOU BOUGHT JUST DIDN'T LAST AND YOU DIDN'T HAVE MONEY TO GET MORE.: NEVER TRUE

## 2024-11-14 SDOH — ECONOMIC STABILITY: INCOME INSECURITY: HOW HARD IS IT FOR YOU TO PAY FOR THE VERY BASICS LIKE FOOD, HOUSING, MEDICAL CARE, AND HEATING?: NOT HARD AT ALL

## 2024-11-14 SDOH — ECONOMIC STABILITY: FOOD INSECURITY: WITHIN THE PAST 12 MONTHS, YOU WORRIED THAT YOUR FOOD WOULD RUN OUT BEFORE YOU GOT MONEY TO BUY MORE.: NEVER TRUE

## 2024-11-14 ASSESSMENT — PATIENT HEALTH QUESTIONNAIRE - PHQ9
SUM OF ALL RESPONSES TO PHQ9 QUESTIONS 1 & 2: 0
SUM OF ALL RESPONSES TO PHQ QUESTIONS 1-9: 0
SUM OF ALL RESPONSES TO PHQ QUESTIONS 1-9: 0
2. FEELING DOWN, DEPRESSED OR HOPELESS: NOT AT ALL
1. LITTLE INTEREST OR PLEASURE IN DOING THINGS: NOT AT ALL
SUM OF ALL RESPONSES TO PHQ QUESTIONS 1-9: 0
SUM OF ALL RESPONSES TO PHQ QUESTIONS 1-9: 0

## 2024-11-14 ASSESSMENT — ENCOUNTER SYMPTOMS
BACK PAIN: 1
EYES NEGATIVE: 1
RESPIRATORY NEGATIVE: 1

## 2024-11-14 NOTE — PROGRESS NOTES
[facility]  [address] [place] [address]  Dept: [phone]  Dept Fax: [phone]  Loc: [phone]    Ricco Miller is a 69 y.o. male who presents today for his medical conditions/complaints as noted below.  Ricco Miller is c/o of [facility], Health Maintenance (Defers pneumococcal and shingles vaccine, agreeable to flu vaccine ), and Neck Pain      Chief Complaint   Patient presents with    [facility] Maintenance     Defers pneumococcal and shingles vaccine, agreeable to flu vaccine     Neck Pain       HPI:     HPI  Patient presents today to [facility].  He has complaints of neck pain.  He states that he has arthritis and knows that without x-ray.  He is wanting disability.  [facility] with [facility].  He is [facility] through [facility].  He does have hearing aids.    Patient has known history of thyroid disease along with hypertension and reflux.  He also takes simvastatin for cholesterol.  Patient has also seen cardiology through [facility] and was noted to have coronary artery disease.  He also was noted to have mild aortic stenosis and a small AAA.  He also has SEAN with cpap and a bad back as well.    GERD as well.  His neck is the main issue at this time.  He is wanting to discuss work up on this and find out if this is the cause of issues.    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      Past Medical History:   Diagnosis Date    Abdominal aortic aneurysm (AAA) 3.0 cm to 5.5 cm in diameter in male (HCC)     3.1 cm diagnosed 2/2023    Glaucoma     Hypertension     Mixed hyperlipidemia     Sleep apnea     Tinnitus         Past Surgical History:   Procedure Laterality Date    ACHILLES TENDON SURGERY Right     COLONOSCOPY  2016    normal per pt- 10 yr recall    COLONOSCOPY N/A 05/18/2023    Dr BLAYNE Sommers-Normal, 10 yr recall    UPPER GASTROINTESTINAL ENDOSCOPY  2016    normal per pt    UPPER

## 2024-11-15 ENCOUNTER — HOSPITAL ENCOUNTER (OUTPATIENT)
Dept: GENERAL RADIOLOGY | Age: 69
Discharge: HOME | End: 2024-11-15
Payer: MEDICARE

## 2024-11-15 DIAGNOSIS — M54.12 CERVICAL RADICULOPATHY: ICD-10-CM

## 2024-11-15 PROCEDURE — 72040 X-RAY EXAM NECK SPINE 2-3 VW: CPT

## 2024-12-12 ENCOUNTER — OFFICE VISIT (OUTPATIENT)
Dept: FAMILY MEDICINE CLINIC | Age: 69
End: 2024-12-12
Payer: MEDICARE

## 2024-12-12 VITALS
TEMPERATURE: 97.2 F | OXYGEN SATURATION: 96 % | BODY MASS INDEX: 27.51 KG/M2 | DIASTOLIC BLOOD PRESSURE: 78 MMHG | SYSTOLIC BLOOD PRESSURE: 124 MMHG | HEIGHT: 77 IN | HEART RATE: 60 BPM | WEIGHT: 233 LBS

## 2024-12-12 DIAGNOSIS — B96.89 ACUTE BACTERIAL BRONCHITIS: Primary | ICD-10-CM

## 2024-12-12 DIAGNOSIS — J20.8 ACUTE BACTERIAL BRONCHITIS: Primary | ICD-10-CM

## 2024-12-12 PROCEDURE — G8419 CALC BMI OUT NRM PARAM NOF/U: HCPCS | Performed by: NURSE PRACTITIONER

## 2024-12-12 PROCEDURE — 3074F SYST BP LT 130 MM HG: CPT | Performed by: NURSE PRACTITIONER

## 2024-12-12 PROCEDURE — 99213 OFFICE O/P EST LOW 20 MIN: CPT | Performed by: NURSE PRACTITIONER

## 2024-12-12 PROCEDURE — 3078F DIAST BP <80 MM HG: CPT | Performed by: NURSE PRACTITIONER

## 2024-12-12 PROCEDURE — G8427 DOCREV CUR MEDS BY ELIG CLIN: HCPCS | Performed by: NURSE PRACTITIONER

## 2024-12-12 PROCEDURE — G8482 FLU IMMUNIZE ORDER/ADMIN: HCPCS | Performed by: NURSE PRACTITIONER

## 2024-12-12 PROCEDURE — 1123F ACP DISCUSS/DSCN MKR DOCD: CPT | Performed by: NURSE PRACTITIONER

## 2024-12-12 PROCEDURE — 3017F COLORECTAL CA SCREEN DOC REV: CPT | Performed by: NURSE PRACTITIONER

## 2024-12-12 PROCEDURE — 1159F MED LIST DOCD IN RCRD: CPT | Performed by: NURSE PRACTITIONER

## 2024-12-12 PROCEDURE — 1160F RVW MEDS BY RX/DR IN RCRD: CPT | Performed by: NURSE PRACTITIONER

## 2024-12-12 PROCEDURE — 1036F TOBACCO NON-USER: CPT | Performed by: NURSE PRACTITIONER

## 2024-12-12 RX ORDER — ALBUTEROL SULFATE 90 UG/1
2 INHALANT RESPIRATORY (INHALATION) 4 TIMES DAILY PRN
Qty: 18 G | Refills: 0 | Status: SHIPPED | OUTPATIENT
Start: 2024-12-12

## 2024-12-12 RX ORDER — AZITHROMYCIN 250 MG/1
TABLET, FILM COATED ORAL
Qty: 6 TABLET | Refills: 0 | Status: SHIPPED | OUTPATIENT
Start: 2024-12-12 | End: 2024-12-22

## 2024-12-12 RX ORDER — PREDNISONE 10 MG/1
10 TABLET ORAL DAILY
Qty: 5 TABLET | Refills: 0 | Status: SHIPPED | OUTPATIENT
Start: 2024-12-12 | End: 2024-12-17

## 2024-12-12 ASSESSMENT — ENCOUNTER SYMPTOMS
SINUS PAIN: 1
SINUS PRESSURE: 1
VOICE CHANGE: 1
GASTROINTESTINAL NEGATIVE: 1
EYES NEGATIVE: 1
COUGH: 1

## 2024-12-12 NOTE — PROGRESS NOTES
[facility]  [address] [place] [address]  Dept: [phone]  Dept Fax: [phone]  Loc: [phone]    Ricco Miller is a 69 y.o. male who presents today for his medical conditions/complaints as noted below.  Ricco Miller is c/o of Cough and Congestion      Chief Complaint   Patient presents with    Cough    Congestion       HPI:     HPI  Patient presents today with complaints of cough and congestion.  Symptoms present for about 8 days.  He has had low grade fever.  He has been taking APAP and doing \"home remedies.\"  Air Borne, vit c, zinc, elderberry, honey.      Past Medical History:   Diagnosis Date    Abdominal aortic aneurysm (AAA) 3.0 cm to 5.5 cm in diameter in male (HCC)     3.1 cm diagnosed 2/2023    Glaucoma     Hypertension     Mixed hyperlipidemia     Sleep apnea     Tinnitus         Past Surgical History:   Procedure Laterality Date    ACHILLES TENDON SURGERY Right     COLONOSCOPY  2016    normal per pt- 10 yr recall    COLONOSCOPY N/A 05/18/2023    Dr BLAYNE Sommers-Normal, 10 yr recall    UPPER GASTROINTESTINAL ENDOSCOPY  2016    normal per pt    UPPER GASTROINTESTINAL ENDOSCOPY N/A 05/18/2023    Dr BLAYNE Sommers-Esophagitis, gastritis, no h pylori       Social History     Tobacco Use    Smoking status: Never    Smokeless tobacco: Never   Substance Use Topics    Alcohol use: Yes     Alcohol/week: 1.0 standard drink of alcohol     Types: 1 Cans of beer per week     Comment: occ        Current Outpatient Medications   Medication Sig Dispense Refill    azithromycin (ZITHROMAX) 250 MG tablet 500mg on day 1 followed by 250mg on days 2 - 5 6 tablet 0    albuterol sulfate HFA (VENTOLIN HFA) 108 (90 Base) MCG/ACT inhaler Inhale 2 puffs into the lungs 4 times daily as needed for Wheezing 18 g 0    predniSONE (DELTASONE) 10 MG tablet Take 1 tablet by mouth daily for 5 days 5 tablet 0    metoprolol succinate (TOPROL XL) 100 MG extended release tablet TAKE ONE TABLET BY

## 2024-12-13 ENCOUNTER — TELEPHONE (OUTPATIENT)
Dept: FAMILY MEDICINE CLINIC | Age: 69
End: 2024-12-13

## 2024-12-13 NOTE — TELEPHONE ENCOUNTER
----- Message from BUSHRA Hernandes sent at 12/13/2024  1:19 PM CST -----  Multiple changes noted including degenerative changes and fusion.   Ok for MRI of cervical spine if patient is ok with it

## 2024-12-13 NOTE — TELEPHONE ENCOUNTER
Called patient, spoke with: Patient regarding the results of the patients most recent xrays.  I advised Patient of Nickie Eckert recommendations.   Patient did voice understanding but wants to wait till his follow-up appointment with Nickie to discuss it more then.

## 2024-12-19 SDOH — HEALTH STABILITY: PHYSICAL HEALTH: ON AVERAGE, HOW MANY MINUTES DO YOU ENGAGE IN EXERCISE AT THIS LEVEL?: 20 MIN

## 2024-12-19 SDOH — HEALTH STABILITY: PHYSICAL HEALTH: ON AVERAGE, HOW MANY DAYS PER WEEK DO YOU ENGAGE IN MODERATE TO STRENUOUS EXERCISE (LIKE A BRISK WALK)?: 2 DAYS

## 2024-12-19 ASSESSMENT — PATIENT HEALTH QUESTIONNAIRE - PHQ9
SUM OF ALL RESPONSES TO PHQ QUESTIONS 1-9: 0
SUM OF ALL RESPONSES TO PHQ QUESTIONS 1-9: 0
2. FEELING DOWN, DEPRESSED OR HOPELESS: NOT AT ALL
SUM OF ALL RESPONSES TO PHQ9 QUESTIONS 1 & 2: 0
SUM OF ALL RESPONSES TO PHQ QUESTIONS 1-9: 0
SUM OF ALL RESPONSES TO PHQ QUESTIONS 1-9: 0
1. LITTLE INTEREST OR PLEASURE IN DOING THINGS: NOT AT ALL

## 2024-12-19 ASSESSMENT — LIFESTYLE VARIABLES
HOW OFTEN DO YOU HAVE A DRINK CONTAINING ALCOHOL: 2-3 TIMES A WEEK
HOW MANY STANDARD DRINKS CONTAINING ALCOHOL DO YOU HAVE ON A TYPICAL DAY: 1
HOW OFTEN DO YOU HAVE A DRINK CONTAINING ALCOHOL: 4
HOW MANY STANDARD DRINKS CONTAINING ALCOHOL DO YOU HAVE ON A TYPICAL DAY: 1 OR 2
HOW OFTEN DO YOU HAVE SIX OR MORE DRINKS ON ONE OCCASION: 1

## 2024-12-20 ENCOUNTER — OFFICE VISIT (OUTPATIENT)
Dept: FAMILY MEDICINE CLINIC | Age: 69
End: 2024-12-20
Payer: MEDICARE

## 2024-12-20 ENCOUNTER — TELEPHONE (OUTPATIENT)
Dept: FAMILY MEDICINE CLINIC | Age: 69
End: 2024-12-20

## 2024-12-20 VITALS
HEIGHT: 77 IN | WEIGHT: 235 LBS | TEMPERATURE: 97.9 F | OXYGEN SATURATION: 98 % | BODY MASS INDEX: 27.75 KG/M2 | SYSTOLIC BLOOD PRESSURE: 124 MMHG | DIASTOLIC BLOOD PRESSURE: 80 MMHG | HEART RATE: 64 BPM

## 2024-12-20 DIAGNOSIS — M54.2 CERVICAL SPINE PAIN: ICD-10-CM

## 2024-12-20 DIAGNOSIS — Z00.00 INITIAL MEDICARE ANNUAL WELLNESS VISIT: Primary | ICD-10-CM

## 2024-12-20 DIAGNOSIS — Z13.1 ENCOUNTER FOR SCREENING FOR DIABETES MELLITUS: ICD-10-CM

## 2024-12-20 DIAGNOSIS — M54.12 CERVICAL RADICULOPATHY: ICD-10-CM

## 2024-12-20 LAB
CHOLESTEROL, TOTAL: NORMAL
CHOLESTEROL/HDL RATIO: NORMAL
GLUCOSE BLD-MCNC: NORMAL MG/DL
HDLC SERPL-MCNC: NORMAL MG/DL
LDL CHOLESTEROL: NORMAL
NONHDLC SERPL-MCNC: NORMAL MG/DL
TRIGL SERPL-MCNC: NORMAL MG/DL
VLDLC SERPL CALC-MCNC: NORMAL MG/DL

## 2024-12-20 PROCEDURE — 3017F COLORECTAL CA SCREEN DOC REV: CPT | Performed by: NURSE PRACTITIONER

## 2024-12-20 PROCEDURE — 1123F ACP DISCUSS/DSCN MKR DOCD: CPT | Performed by: NURSE PRACTITIONER

## 2024-12-20 PROCEDURE — 3074F SYST BP LT 130 MM HG: CPT | Performed by: NURSE PRACTITIONER

## 2024-12-20 PROCEDURE — 1159F MED LIST DOCD IN RCRD: CPT | Performed by: NURSE PRACTITIONER

## 2024-12-20 PROCEDURE — G0438 PPPS, INITIAL VISIT: HCPCS | Performed by: NURSE PRACTITIONER

## 2024-12-20 PROCEDURE — 3079F DIAST BP 80-89 MM HG: CPT | Performed by: NURSE PRACTITIONER

## 2024-12-20 PROCEDURE — 1160F RVW MEDS BY RX/DR IN RCRD: CPT | Performed by: NURSE PRACTITIONER

## 2024-12-20 PROCEDURE — G8482 FLU IMMUNIZE ORDER/ADMIN: HCPCS | Performed by: NURSE PRACTITIONER

## 2024-12-20 NOTE — PROGRESS NOTES
Medicare Annual Wellness Visit    Ricco Miller is here for Medicare AWV    Assessment & Plan  1. Cervical Disc Degeneration.  The x-ray results indicate fusion at the C2-C3 disc space, moderate degenerative disc changes throughout the cervical spine, and loss of cervical lordosis. These findings are likely contributing to his neck pain. The pain may also be exacerbated by overcompensation due to lower back pain. An MRI will be ordered to further evaluate the condition. A nexus letter will be prepared within the next 24 hours to assist with his disability claim.    2. Chronic Cough.  He reports ongoing issues with clearing phlegm and an intermittent cough that has persisted despite medication. It was discussed that coughs can sometimes last 4 to 6 weeks before completely clearing up.    Follow-up  The patient will follow up in early February 2025.    Initial Medicare annual wellness visit  Cervical radiculopathy  Cervical spine pain  Results      Recommendations for Preventive Services Due: see orders and patient instructions/AVS.  Recommended screening schedule for the next 5-10 years is provided to the patient in written form: see Patient Instructions/AVS.     Return in about 7 weeks (around 2/5/2025), or if symptoms worsen or fail to improve, for neck follow up.     Subjective   History of Present Illness  The patient presents for a follow-up visit and evaluation of neck pain.    He has been experiencing persistent neck pain, which he attributes to his  service in the [employer] as a . His duties included carrying heavy rucksacks, long marches, lifting, and rappelling, which he believes have contributed to his current condition. He recalls an incident approximately 15 years ago where he sustained an injury while moving a drum on the [employer]. Despite engaging in abdominal exercises such as crunches for six months, he reports no improvement in his symptoms. He has been advised by his

## 2024-12-20 NOTE — PATIENT INSTRUCTIONS
Learning About Being Active as an Older Adult  Why is being active important as you get older?     Being active is one of the best things you can do for your health. And it's never too late to start. Being active--or getting active, if you aren't already--has definite benefits. It can:  Give you more energy,  Keep your mind sharp.  Improve balance to reduce your risk of falls.  Help you manage chronic illness with fewer medicines.  No matter how old you are, how fit you are, or what health problems you have, there is a form of activity that will work for you. And the more physical activity you can do, the better your overall health will be.  What kinds of activity can help you stay healthy?  Being more active will make your daily activities easier. Physical activity includes planned exercise and things you do in daily life. There are four types of activity:  Aerobic.  Doing aerobic activity makes your heart and lungs strong.  Includes walking, dancing, and gardening.  Aim for at least 2½ hours spread throughout the week.  It improves your energy and can help you sleep better.  Muscle-strengthening.  This type of activity can help maintain muscle and strengthen bones.  Includes climbing stairs, using resistance bands, and lifting or carrying heavy loads.  Aim for at least twice a week.  It can help protect the knees and other joints.  Stretching.  Stretching gives you better range of motion in joints and muscles.  Includes upper arm stretches, calf stretches, and gentle yoga.  Aim for at least twice a week, preferably after your muscles are warmed up from other activities.  It can help you function better in daily life.  Balancing.  This helps you stay coordinated and have good posture.  Includes heel-to-toe walking, elana chi, and certain types of yoga.  Aim for at least 3 days a week.  It can reduce your risk of falling.  Even if you have a hard time meeting the recommendations, it's better to be more active

## 2025-02-24 SDOH — HEALTH STABILITY: PHYSICAL HEALTH: ON AVERAGE, HOW MANY DAYS PER WEEK DO YOU ENGAGE IN MODERATE TO STRENUOUS EXERCISE (LIKE A BRISK WALK)?: 3 DAYS

## 2025-02-24 SDOH — HEALTH STABILITY: PHYSICAL HEALTH: ON AVERAGE, HOW MANY MINUTES DO YOU ENGAGE IN EXERCISE AT THIS LEVEL?: 20 MIN

## 2025-02-25 ENCOUNTER — OFFICE VISIT (OUTPATIENT)
Age: 70
End: 2025-02-25
Payer: MEDICARE

## 2025-02-25 VITALS
BODY MASS INDEX: 28.81 KG/M2 | SYSTOLIC BLOOD PRESSURE: 124 MMHG | TEMPERATURE: 97.1 F | OXYGEN SATURATION: 98 % | HEART RATE: 64 BPM | HEIGHT: 77 IN | DIASTOLIC BLOOD PRESSURE: 80 MMHG | WEIGHT: 244 LBS

## 2025-02-25 DIAGNOSIS — K21.9 GASTROESOPHAGEAL REFLUX DISEASE WITHOUT ESOPHAGITIS: Primary | ICD-10-CM

## 2025-02-25 PROCEDURE — G8427 DOCREV CUR MEDS BY ELIG CLIN: HCPCS | Performed by: NURSE PRACTITIONER

## 2025-02-25 PROCEDURE — 3079F DIAST BP 80-89 MM HG: CPT | Performed by: NURSE PRACTITIONER

## 2025-02-25 PROCEDURE — 1123F ACP DISCUSS/DSCN MKR DOCD: CPT | Performed by: NURSE PRACTITIONER

## 2025-02-25 PROCEDURE — G8419 CALC BMI OUT NRM PARAM NOF/U: HCPCS | Performed by: NURSE PRACTITIONER

## 2025-02-25 PROCEDURE — 1159F MED LIST DOCD IN RCRD: CPT | Performed by: NURSE PRACTITIONER

## 2025-02-25 PROCEDURE — 99214 OFFICE O/P EST MOD 30 MIN: CPT | Performed by: NURSE PRACTITIONER

## 2025-02-25 PROCEDURE — 3074F SYST BP LT 130 MM HG: CPT | Performed by: NURSE PRACTITIONER

## 2025-02-25 RX ORDER — FAMOTIDINE 20 MG/1
20 TABLET, FILM COATED ORAL 2 TIMES DAILY
Qty: 60 TABLET | Refills: 1 | Status: SHIPPED | OUTPATIENT
Start: 2025-02-25

## 2025-02-25 ASSESSMENT — ENCOUNTER SYMPTOMS
ABDOMINAL PAIN: 1
EYES NEGATIVE: 1
RESPIRATORY NEGATIVE: 1

## 2025-02-25 NOTE — PROGRESS NOTES
Ricco Miller (:  1955) is a 69 y.o. male, Established patient, here for evaluation of the following chief complaint(s):  Wheezing and Congestion         Assessment & Plan  1. Gastroesophageal Reflux Disease (GERD): - chronic and worsening  - Add famotidine 20 mg, 1 tablet twice daily, to the current regimen of omeprazole 40 mg twice daily to help neutralize stomach acid.  - Advise taking omeprazole separately from other medications to avoid interactions.  - If no improvement within a week, contact the office via [employer] message for further evaluation and potential treatment for sinusitis.  - If symptoms persist, consider referral to a gastroenterologist.    2. Sinusitis:  - If no improvement in symptoms within a week of starting famotidine, contact the office for further evaluation and for treatment with steroids and antibiotics.    Follow-up  - Contact the office via [employer] message if there is no improvement within a week.    Results    No results found for this visit on 25.    ICD-10-CM    1. Gastroesophageal reflux disease without esophagitis  K21.9          Return if symptoms worsen or fail to improve.       Subjective   History of Present Illness  The patient presents for evaluation of a burning sensation in the chest.    Burning Sensation in Chest (Potential GERD)  He reports a persistent burning sensation in his chest, which he attributes to potential GERD. He has been sleeping on a wedge pillow for the past 6 to 7 months and uses a CPAP machine at night, which he cleans twice daily. He has attempted to manage his symptoms with Tums, which provided some relief, and has Pepto-Bismol at home, although he has not yet used it. He is currently on omeprazole 40 mg twice daily but discontinued pantoprazole due to its interference with his antiarrhythmic medication. He also takes thyroid medication, which he is instructed to take on an empty stomach first thing in the morning.  - Onset: Persistent

## 2025-03-14 ENCOUNTER — OFFICE VISIT (OUTPATIENT)
Dept: GASTROENTEROLOGY | Age: 70
End: 2025-03-14
Payer: OTHER GOVERNMENT

## 2025-03-14 VITALS
OXYGEN SATURATION: 96 % | WEIGHT: 240 LBS | HEIGHT: 77 IN | HEART RATE: 56 BPM | SYSTOLIC BLOOD PRESSURE: 120 MMHG | DIASTOLIC BLOOD PRESSURE: 80 MMHG | BODY MASS INDEX: 28.34 KG/M2

## 2025-03-14 DIAGNOSIS — K21.9 GASTROESOPHAGEAL REFLUX DISEASE, UNSPECIFIED WHETHER ESOPHAGITIS PRESENT: Primary | ICD-10-CM

## 2025-03-14 PROCEDURE — 99213 OFFICE O/P EST LOW 20 MIN: CPT | Performed by: NURSE PRACTITIONER

## 2025-03-14 PROCEDURE — 3074F SYST BP LT 130 MM HG: CPT | Performed by: NURSE PRACTITIONER

## 2025-03-14 PROCEDURE — 1123F ACP DISCUSS/DSCN MKR DOCD: CPT | Performed by: NURSE PRACTITIONER

## 2025-03-14 PROCEDURE — 3079F DIAST BP 80-89 MM HG: CPT | Performed by: NURSE PRACTITIONER

## 2025-03-14 RX ORDER — TIMOLOL MALEATE 5 MG/ML
1 SOLUTION/ DROPS OPHTHALMIC 2 TIMES DAILY
COMMUNITY

## 2025-03-14 RX ORDER — DORZOLAMIDE HCL 20 MG/ML
2 SOLUTION/ DROPS OPHTHALMIC 2 TIMES DAILY
COMMUNITY

## 2025-03-21 ENCOUNTER — OFFICE VISIT (OUTPATIENT)
Age: 70
End: 2025-03-21
Payer: MEDICARE

## 2025-03-21 VITALS
TEMPERATURE: 97.8 F | BODY MASS INDEX: 28.46 KG/M2 | DIASTOLIC BLOOD PRESSURE: 80 MMHG | SYSTOLIC BLOOD PRESSURE: 128 MMHG | OXYGEN SATURATION: 97 % | HEART RATE: 78 BPM | WEIGHT: 241 LBS | HEIGHT: 77 IN

## 2025-03-21 DIAGNOSIS — J32.4 CHRONIC PANSINUSITIS: Primary | ICD-10-CM

## 2025-03-21 DIAGNOSIS — Z13.1 ENCOUNTER FOR SCREENING FOR DIABETES MELLITUS: ICD-10-CM

## 2025-03-21 PROCEDURE — 3079F DIAST BP 80-89 MM HG: CPT | Performed by: NURSE PRACTITIONER

## 2025-03-21 PROCEDURE — G8427 DOCREV CUR MEDS BY ELIG CLIN: HCPCS | Performed by: NURSE PRACTITIONER

## 2025-03-21 PROCEDURE — 1123F ACP DISCUSS/DSCN MKR DOCD: CPT | Performed by: NURSE PRACTITIONER

## 2025-03-21 PROCEDURE — 3074F SYST BP LT 130 MM HG: CPT | Performed by: NURSE PRACTITIONER

## 2025-03-21 PROCEDURE — 1159F MED LIST DOCD IN RCRD: CPT | Performed by: NURSE PRACTITIONER

## 2025-03-21 PROCEDURE — 96372 THER/PROPH/DIAG INJ SC/IM: CPT | Performed by: NURSE PRACTITIONER

## 2025-03-21 PROCEDURE — 99213 OFFICE O/P EST LOW 20 MIN: CPT | Performed by: NURSE PRACTITIONER

## 2025-03-21 PROCEDURE — G8419 CALC BMI OUT NRM PARAM NOF/U: HCPCS | Performed by: NURSE PRACTITIONER

## 2025-03-21 PROCEDURE — 1160F RVW MEDS BY RX/DR IN RCRD: CPT | Performed by: NURSE PRACTITIONER

## 2025-03-21 RX ORDER — CLARITHROMYCIN 250 MG/1
250 TABLET, FILM COATED ORAL 2 TIMES DAILY
Qty: 40 TABLET | Refills: 0 | Status: SHIPPED | OUTPATIENT
Start: 2025-03-21 | End: 2025-04-10

## 2025-03-21 RX ORDER — PREDNISONE 10 MG/1
TABLET ORAL
Qty: 20 TABLET | Refills: 0 | Status: SHIPPED | OUTPATIENT
Start: 2025-03-21 | End: 2025-04-10

## 2025-03-21 RX ORDER — DEXAMETHASONE SODIUM PHOSPHATE 10 MG/ML
5 INJECTION, SOLUTION INTRA-ARTICULAR; INTRALESIONAL; INTRAMUSCULAR; INTRAVENOUS; SOFT TISSUE ONCE
Status: COMPLETED | OUTPATIENT
Start: 2025-03-21 | End: 2025-03-21

## 2025-03-21 RX ADMIN — DEXAMETHASONE SODIUM PHOSPHATE 5 MG: 10 INJECTION, SOLUTION INTRA-ARTICULAR; INTRALESIONAL; INTRAMUSCULAR; INTRAVENOUS; SOFT TISSUE at 11:47

## 2025-03-21 SDOH — ECONOMIC STABILITY: INCOME INSECURITY: IN THE LAST 12 MONTHS, WAS THERE A TIME WHEN YOU WERE NOT ABLE TO PAY THE MORTGAGE OR RENT ON TIME?: NO

## 2025-03-21 SDOH — ECONOMIC STABILITY: FOOD INSECURITY: WITHIN THE PAST 12 MONTHS, THE FOOD YOU BOUGHT JUST DIDN'T LAST AND YOU DIDN'T HAVE MONEY TO GET MORE.: NEVER TRUE

## 2025-03-21 SDOH — ECONOMIC STABILITY: FOOD INSECURITY: WITHIN THE PAST 12 MONTHS, YOU WORRIED THAT YOUR FOOD WOULD RUN OUT BEFORE YOU GOT MONEY TO BUY MORE.: NEVER TRUE

## 2025-03-21 SDOH — ECONOMIC STABILITY: TRANSPORTATION INSECURITY
IN THE PAST 12 MONTHS, HAS THE LACK OF TRANSPORTATION KEPT YOU FROM MEDICAL APPOINTMENTS OR FROM GETTING MEDICATIONS?: NO

## 2025-03-21 SDOH — ECONOMIC STABILITY: TRANSPORTATION INSECURITY
IN THE PAST 12 MONTHS, HAS LACK OF TRANSPORTATION KEPT YOU FROM MEETINGS, WORK, OR FROM GETTING THINGS NEEDED FOR DAILY LIVING?: NO

## 2025-03-21 ASSESSMENT — ENCOUNTER SYMPTOMS
GASTROINTESTINAL NEGATIVE: 1
SINUS PRESSURE: 1
RESPIRATORY NEGATIVE: 1
EYES NEGATIVE: 1
SINUS PAIN: 1

## 2025-03-21 ASSESSMENT — PATIENT HEALTH QUESTIONNAIRE - PHQ9
2. FEELING DOWN, DEPRESSED OR HOPELESS: NOT AT ALL
SUM OF ALL RESPONSES TO PHQ QUESTIONS 1-9: 0
1. LITTLE INTEREST OR PLEASURE IN DOING THINGS: NOT AT ALL
SUM OF ALL RESPONSES TO PHQ9 QUESTIONS 1 & 2: 0
2. FEELING DOWN, DEPRESSED OR HOPELESS: NOT AT ALL
1. LITTLE INTEREST OR PLEASURE IN DOING THINGS: NOT AT ALL
SUM OF ALL RESPONSES TO PHQ QUESTIONS 1-9: 0

## 2025-03-21 NOTE — PROGRESS NOTES
Ricco Miller (:  1955) is a 69 y.o. male, Established patient, here for evaluation of the following chief complaint(s):  Gastroesophageal Reflux and Congestion         Assessment & Plan  1. Chronic Sinusitis: Symptoms suggest chronic sinusitis, potentially exacerbated by CPAP use and reflux.  - Clarithromycin 500 mg twice daily for 20 days  - Prednisone taper: 20 mg for 5 days, 10 mg for 5 days, 5 mg for 10 days  - Administer steroid injection today  - Use a neti pot for sinus irrigation  - Consider CT scan of the sinuses if symptoms persist    Follow-up  - Monitor symptoms and consider CT scan if no improvement    Results    No results found for this visit on 25.    ICD-10-CM    1. Chronic pansinusitis  J32.4 dexAMETHasone (DECADRON) injection 5 mg      2. Encounter for screening for diabetes mellitus  Z13.1 Glucose, Fasting [[id number]]         Return if symptoms worsen or fail to improve.       Subjective   History of Present Illness  The patient presents for evaluation of sinusitis.    Sinusitis  He reports a partial improvement in his condition following medication use. This morning, he experienced green nasal discharge on two occasions, once while using a paper towel and subsequently in the bathroom. He is a mouth breather during sleep and uses a CPAP machine, which he suspects may be contributing to his symptoms by facilitating sinus drainage into his throat. His condition typically improves within 2 to 3 days of antibiotic therapy, but this time, improvement was only noted towards the end of the antibiotic course. His symptoms recurred 2 to 3 days post-antibiotic completion. He maintains his CPAP machine by cleaning it with hot water every other day, avoiding soap due to its odor. He has been advised against using a UV  for his CPAP machine due to potential carcinogenic risks and degradation of the silicone components. He has replaced the mask and hose of his CPAP machine. He

## 2025-04-07 ENCOUNTER — PATIENT MESSAGE (OUTPATIENT)
Age: 70
End: 2025-04-07

## 2025-05-13 ENCOUNTER — OFFICE VISIT (OUTPATIENT)
Age: 70
End: 2025-05-13
Payer: MEDICARE

## 2025-05-13 VITALS
HEIGHT: 77 IN | TEMPERATURE: 97 F | HEART RATE: 78 BPM | DIASTOLIC BLOOD PRESSURE: 80 MMHG | BODY MASS INDEX: 28.57 KG/M2 | OXYGEN SATURATION: 98 % | WEIGHT: 242 LBS | SYSTOLIC BLOOD PRESSURE: 128 MMHG

## 2025-05-13 DIAGNOSIS — M51.372 DEGENERATION OF INTERVERTEBRAL DISC OF LUMBOSACRAL REGION WITH DISCOGENIC BACK PAIN AND LOWER EXTREMITY PAIN: Primary | ICD-10-CM

## 2025-05-13 PROCEDURE — 1123F ACP DISCUSS/DSCN MKR DOCD: CPT | Performed by: NURSE PRACTITIONER

## 2025-05-13 PROCEDURE — G8427 DOCREV CUR MEDS BY ELIG CLIN: HCPCS | Performed by: NURSE PRACTITIONER

## 2025-05-13 PROCEDURE — 99213 OFFICE O/P EST LOW 20 MIN: CPT | Performed by: NURSE PRACTITIONER

## 2025-05-13 PROCEDURE — 3079F DIAST BP 80-89 MM HG: CPT | Performed by: NURSE PRACTITIONER

## 2025-05-13 PROCEDURE — 1159F MED LIST DOCD IN RCRD: CPT | Performed by: NURSE PRACTITIONER

## 2025-05-13 PROCEDURE — G8419 CALC BMI OUT NRM PARAM NOF/U: HCPCS | Performed by: NURSE PRACTITIONER

## 2025-05-13 PROCEDURE — 3074F SYST BP LT 130 MM HG: CPT | Performed by: NURSE PRACTITIONER

## 2025-05-13 PROCEDURE — 1160F RVW MEDS BY RX/DR IN RCRD: CPT | Performed by: NURSE PRACTITIONER

## 2025-05-13 ASSESSMENT — ENCOUNTER SYMPTOMS
GASTROINTESTINAL NEGATIVE: 1
EYES NEGATIVE: 1
BACK PAIN: 1
RESPIRATORY NEGATIVE: 1

## 2025-05-13 NOTE — PROGRESS NOTES
Ricco Miller (:  1955) is a 69 y.o. male, Established patient, here for evaluation of the following chief complaint(s):  Discuss Back Issue         Assessment & Plan  1. Back pain: Chronic. Reports significant back pain affecting daily activities and requiring the use of a cane and back brace. Experiences incapacitating episodes lasting up to 1-2 weeks in the past 12 months.  - Order x-ray of the lumbar spine to assess the extent of degenerative disk disease.  - Referral to physical therapy for a comprehensive evaluation of range of motion and functional limitations.    2. Disability evaluation: Concerned about a recent [facility] evaluation that may reduce disability rating. Reports discrepancies between the [facility] examiner's verbal statements and the written report.  - Obtain a copy of the [facility] examiner's report for review if possible        Results    No results found for this visit on 25.    ICD-10-CM    1. Degeneration of intervertebral disc of lumbosacral region with discogenic back pain and lower extremity pain  M51.372 External Referral To Physical Therapy     XR LUMBAR SPINE (2-3 VIEWS)         Return if symptoms worsen or fail to improve.       Subjective   History of Present Illness  The patient presents for evaluation of back pain lumbar.    Back Pain and Related Symptoms  He has been experiencing incapacitating episodes, characterized by persistent back soreness that hinders his ability to perform daily activities such as household chores and gardening. He reports difficulty in straightening his back upon rising in the morning, necessitating a crab-like walk. He also experiences periods of immobility, lasting up to 2 days, during which he relies on a cane for mobility and a back brace for support. His legs become weak during back flare-ups, which interferes with his exercise routine. He is attempting to maintain abdominal strength but finds it challenging due to his condition. He also reports pain

## 2025-05-16 ENCOUNTER — TELEPHONE (OUTPATIENT)
Age: 70
End: 2025-05-16

## 2025-05-16 NOTE — TELEPHONE ENCOUNTER
Patient called requesting to speak to Nickie Riddle's nurse.  I explained to him that Janessa was downstairs working this morning and asked him if I could help him with anything.  Patient told me that she had sent him a message on [employer] this morning regarding his physical therapy.  So I looked this up and I asked patient where he wanted to have this done at, and he said this is the tricky part and started discussing something about the [facility] and he said you probably do not want to get involved in this, and that he really wanted to speak to Janessa.  I advised patient I would send this call to Janessa and have her give him a call when she gets back up to the office around lunchtime.  Patient understood.

## 2025-05-20 ENCOUNTER — OFFICE VISIT (OUTPATIENT)
Age: 70
End: 2025-05-20
Payer: MEDICARE

## 2025-05-20 ENCOUNTER — RESULTS FOLLOW-UP (OUTPATIENT)
Age: 70
End: 2025-05-20

## 2025-05-20 VITALS
OXYGEN SATURATION: 98 % | SYSTOLIC BLOOD PRESSURE: 128 MMHG | WEIGHT: 242 LBS | HEART RATE: 68 BPM | DIASTOLIC BLOOD PRESSURE: 78 MMHG | BODY MASS INDEX: 28.57 KG/M2 | HEIGHT: 77 IN | TEMPERATURE: 97.4 F

## 2025-05-20 DIAGNOSIS — M51.372 DEGENERATION OF INTERVERTEBRAL DISC OF LUMBOSACRAL REGION WITH DISCOGENIC BACK PAIN AND LOWER EXTREMITY PAIN: Primary | ICD-10-CM

## 2025-05-20 PROCEDURE — 1159F MED LIST DOCD IN RCRD: CPT | Performed by: NURSE PRACTITIONER

## 2025-05-20 PROCEDURE — 99214 OFFICE O/P EST MOD 30 MIN: CPT | Performed by: NURSE PRACTITIONER

## 2025-05-20 PROCEDURE — G8419 CALC BMI OUT NRM PARAM NOF/U: HCPCS | Performed by: NURSE PRACTITIONER

## 2025-05-20 PROCEDURE — G8427 DOCREV CUR MEDS BY ELIG CLIN: HCPCS | Performed by: NURSE PRACTITIONER

## 2025-05-20 PROCEDURE — 3074F SYST BP LT 130 MM HG: CPT | Performed by: NURSE PRACTITIONER

## 2025-05-20 PROCEDURE — 3078F DIAST BP <80 MM HG: CPT | Performed by: NURSE PRACTITIONER

## 2025-05-20 PROCEDURE — 1160F RVW MEDS BY RX/DR IN RCRD: CPT | Performed by: NURSE PRACTITIONER

## 2025-05-20 PROCEDURE — 1123F ACP DISCUSS/DSCN MKR DOCD: CPT | Performed by: NURSE PRACTITIONER

## 2025-05-21 ASSESSMENT — ENCOUNTER SYMPTOMS
RESPIRATORY NEGATIVE: 1
BACK PAIN: 1
GASTROINTESTINAL NEGATIVE: 1
EYES NEGATIVE: 1

## 2025-05-21 NOTE — PROGRESS NOTES
Ricco Miller (:  1955) is a 69 y.o. male, Established patient, here for evaluation of the following chief complaint(s):  Discuss X-Rays         Assessment & Plan  1. Lumbosacral spine degenerative disc disease with degenerative joint disease and intervertebral disc syndrome: Chronic.  Worsening   X-ray results indicate compression and narrowing in the disc space, moderate degenerative changes, slight retrolisthesis at L1-L3, and mild lateral curvature of the spine, suggesting limited range of motion and tightening.  - Order MRI to further evaluate the condition.  - Schedule MRI within the next week for timely documentation and progression of treatment.  - Review MRI results and discuss next steps within 14 days.    Follow-up  - Follow up within the next 14 days to review MRI results and discuss the next steps.    Results  - Imaging:    - X-ray of the lumbar spine: Shows some compression and narrowing in the disk space, moderate degenerative changes, and slight retrolisthesis at L1-L3. Mild labral curvature of the spine is also observed    No results found for this visit on 25.    ICD-10-CM    1. Degeneration of intervertebral disc of lumbosacral region with discogenic back pain and lower extremity pain  M51.372 MRI LUMBAR SPINE WO CONTRAST         Return in about 2 weeks (around 6/3/2025), or if symptoms worsen or fail to improve, for lumbar back concerns.       Subjective   History of Present Illness  The patient presents for evaluation of lumbosacral spine degenerative disc disease with degenerative joint disease and intervertebral disc syndrome.    Lumbosacral Spine Degenerative Disc Disease  He reports experiencing incapacitating episodes that significantly impact his daily activities, such as gardening. Pain levels have escalated, leading to a decrease in functional capacity and overall quality of life. During a recent evaluation, he was informed that his flexion has notably decreased since

## 2025-05-27 ENCOUNTER — RESULTS FOLLOW-UP (OUTPATIENT)
Age: 70
End: 2025-05-27

## 2025-06-03 ENCOUNTER — OFFICE VISIT (OUTPATIENT)
Age: 70
End: 2025-06-03
Payer: MEDICARE

## 2025-06-03 VITALS
HEIGHT: 77 IN | OXYGEN SATURATION: 98 % | WEIGHT: 243 LBS | DIASTOLIC BLOOD PRESSURE: 82 MMHG | BODY MASS INDEX: 28.69 KG/M2 | SYSTOLIC BLOOD PRESSURE: 128 MMHG | HEART RATE: 76 BPM | TEMPERATURE: 97.9 F

## 2025-06-03 DIAGNOSIS — M51.369 BULGING OF LUMBAR INTERVERTEBRAL DISC: ICD-10-CM

## 2025-06-03 DIAGNOSIS — M51.372 DEGENERATION OF INTERVERTEBRAL DISC OF LUMBOSACRAL REGION WITH DISCOGENIC BACK PAIN AND LOWER EXTREMITY PAIN: Primary | ICD-10-CM

## 2025-06-03 PROCEDURE — 3079F DIAST BP 80-89 MM HG: CPT | Performed by: NURSE PRACTITIONER

## 2025-06-03 PROCEDURE — G8419 CALC BMI OUT NRM PARAM NOF/U: HCPCS | Performed by: NURSE PRACTITIONER

## 2025-06-03 PROCEDURE — G8427 DOCREV CUR MEDS BY ELIG CLIN: HCPCS | Performed by: NURSE PRACTITIONER

## 2025-06-03 PROCEDURE — 99214 OFFICE O/P EST MOD 30 MIN: CPT | Performed by: NURSE PRACTITIONER

## 2025-06-03 PROCEDURE — 1123F ACP DISCUSS/DSCN MKR DOCD: CPT | Performed by: NURSE PRACTITIONER

## 2025-06-03 PROCEDURE — 3074F SYST BP LT 130 MM HG: CPT | Performed by: NURSE PRACTITIONER

## 2025-06-03 PROCEDURE — 1160F RVW MEDS BY RX/DR IN RCRD: CPT | Performed by: NURSE PRACTITIONER

## 2025-06-03 PROCEDURE — 1159F MED LIST DOCD IN RCRD: CPT | Performed by: NURSE PRACTITIONER

## 2025-06-03 ASSESSMENT — ENCOUNTER SYMPTOMS
RESPIRATORY NEGATIVE: 1
BACK PAIN: 1
EYES NEGATIVE: 1
GASTROINTESTINAL NEGATIVE: 1

## 2025-06-03 NOTE — PROGRESS NOTES
by mouth daily Yes Provider, Historical, MD   lisinopril (PRINIVIL;ZESTRIL) 5 MG tablet Take 1 tablet by mouth daily Yes Provider, MD Allen   levothyroxine (SYNTHROID) 175 MCG tablet Take 1 tablet by mouth Daily Yes Provider, MD Allen   simvastatin (ZOCOR) 40 MG tablet Take 1 tablet by mouth nightly Yes Provider, MD Allen        Allergies   Allergen Reactions    Pantoprazole Palpitations       Past Medical History:   Diagnosis Date    Abdominal aortic aneurysm (AAA) 3.0 cm to 5.5 cm in diameter in male     3.1 cm diagnosed 2/2023    Glaucoma     Hypertension     Mixed hyperlipidemia     Sleep apnea     Tinnitus        Past Surgical History:   Procedure Laterality Date    ACHILLES TENDON SURGERY Right     COLONOSCOPY  2016    normal per pt- 10 yr recall    COLONOSCOPY N/A 05/18/2023    Dr BLAYNE Sommers-Normal, 10 yr recall    UPPER GASTROINTESTINAL ENDOSCOPY  2016    normal per pt    UPPER GASTROINTESTINAL ENDOSCOPY N/A 05/18/2023    Dr BLAYNE Sommers-Esophagitis, gastritis, no h pylori       Family History   Problem Relation Age of Onset    Colon Polyps Neg Hx     Colon Cancer Neg Hx          Review of Systems   Constitutional: Negative.    HENT: Negative.     Eyes: Negative.    Respiratory: Negative.     Cardiovascular: Negative.    Gastrointestinal: Negative.    Endocrine: Negative.    Genitourinary: Negative.    Musculoskeletal:  Positive for arthralgias and back pain (lumbar and thoracic).   Skin: Negative.    Neurological: Negative.    Hematological: Negative.    Psychiatric/Behavioral: Negative.            Objective   Blood pressure 128/82, pulse 76, temperature 97.9 °F (36.6 °C), temperature source Temporal, height 1.956 m (6' 5\"), weight 110.2 kg (243 lb), SpO2 98%.       Physical Exam  Vitals and nursing note reviewed.   Constitutional:       Appearance: Normal appearance.   HENT:      Head: Normocephalic and atraumatic.      Right Ear: Hearing, tympanic membrane, ear canal and external ear normal.

## 2025-06-10 ENCOUNTER — TELEPHONE (OUTPATIENT)
Age: 70
End: 2025-06-10

## 2025-06-10 NOTE — TELEPHONE ENCOUNTER
Patient sent this message through [employer] but sent as a billing question, so I did not get it.  He stopped by office today in regards to this.        Janessa could you please ask Nickie when I can expect the summary of my V.A. response to them. I understand we have not received the P.T. report as of Friday. If I can pish this along let me know. I am trying to get this to my V.A. rep. in [place] before she leaves for vacation.      Thank You.   Ricco Miller      I explained that we still did not have the report and he stated that he was going to go get it and bring it back to the office.

## (undated) DEVICE — ENDO KIT: Brand: MEDLINE INDUSTRIES, INC.

## (undated) DEVICE — SINGLE PORT MANIFOLD: Brand: NEPTUNE 2

## (undated) DEVICE — ENDO KIT,LOURDES HOSPITAL: Brand: MEDLINE INDUSTRIES, INC.

## (undated) DEVICE — CANNULA NSL AD L7FT DIV O2 CO2 W/ M LUERLOCK TRMPT CONN

## (undated) DEVICE — FORCEPS BX 240CM 2.4MM L NDL RAD JAW 4 M00513334

## (undated) DEVICE — BITE BLOCK ENDOSCP AD 60 FR W/ ADJ STRP PLAS GRN BLOX